# Patient Record
Sex: FEMALE | Race: WHITE
[De-identification: names, ages, dates, MRNs, and addresses within clinical notes are randomized per-mention and may not be internally consistent; named-entity substitution may affect disease eponyms.]

---

## 2017-02-24 ENCOUNTER — HOSPITAL ENCOUNTER (EMERGENCY)
Dept: HOSPITAL 59 - ER | Age: 67
LOS: 1 days | Discharge: HOME | End: 2017-02-25
Payer: MEDICARE

## 2017-02-24 DIAGNOSIS — Z87.891: ICD-10-CM

## 2017-02-24 DIAGNOSIS — R51: ICD-10-CM

## 2017-02-24 DIAGNOSIS — M54.2: ICD-10-CM

## 2017-02-24 DIAGNOSIS — R20.0: ICD-10-CM

## 2017-02-24 DIAGNOSIS — E11.9: ICD-10-CM
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
## 2017-02-24 DIAGNOSIS — S46.911A: Primary | ICD-10-CM

## 2017-02-24 DIAGNOSIS — I10: ICD-10-CM
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
## 2017-02-24 DIAGNOSIS — S70.01XA: ICD-10-CM

## 2017-02-24 PROCEDURE — 73521 X-RAY EXAM HIPS BI 2 VIEWS: CPT

## 2017-02-24 PROCEDURE — 80048 BASIC METABOLIC PNL TOTAL CA: CPT

## 2017-02-24 PROCEDURE — 72125 CT NECK SPINE W/O DYE: CPT

## 2017-02-24 PROCEDURE — 99284 EMERGENCY DEPT VISIT MOD MDM: CPT

## 2017-02-24 PROCEDURE — 70450 CT HEAD/BRAIN W/O DYE: CPT

## 2017-02-24 PROCEDURE — 99283 EMERGENCY DEPT VISIT LOW MDM: CPT

## 2017-02-24 PROCEDURE — 85025 COMPLETE CBC W/AUTO DIFF WBC: CPT

## 2017-02-24 PROCEDURE — 71020: CPT

## 2017-02-25 LAB
ANION GAP SERPL CALC-SCNC: 15.1 MMOL/L (ref 7–16)
BASOPHILS NFR BLD: 0.3 % (ref 0–6)
BUN SERPL-MCNC: 23 MG/DL (ref 7–17)
CO2 SERPL-SCNC: 25.9 MMOL/L (ref 22–30)
CREAT SERPL-MCNC: 1 MG/DL (ref 0.52–1.04)
EOSINOPHIL NFR BLD: 0.3 % (ref 0–6)
ERYTHROCYTE [DISTWIDTH] IN BLOOD BY AUTOMATED COUNT: 15 % (ref 11.5–14.5)
EST GLOMERULAR FILTRATION RATE: 59 ML/MIN
GLUCOSE SERPL-MCNC: 107 MG/DL (ref 70–110)
GRANULOCYTES NFR BLD: 46 % (ref 47–80)
HCT VFR BLD CALC: 38.5 % (ref 35–47)
HGB BLD-MCNC: 12.4 GM/DL (ref 11.6–16)
LYMPHOCYTES NFR BLD AUTO: 44.8 % (ref 16–45)
MCH RBC QN AUTO: 29.4 PG (ref 27–33)
MCHC RBC AUTO-ENTMCNC: 32.2 G/DL (ref 32–36)
MCV RBC AUTO: 91.2 FL (ref 81–97)
MONOCYTES NFR BLD: 8.6 % (ref 0–9)
PLATELET # BLD: 250 K/UL (ref 130–400)
PMV BLD AUTO: 10.5 FL (ref 7.4–10.4)
RBC # BLD AUTO: 4.22 M/UL (ref 3.8–5.4)
WBC # BLD AUTO: 9.1 K/UL (ref 4.2–12.2)
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
## 2017-04-22 ENCOUNTER — HOSPITAL ENCOUNTER (EMERGENCY)
Dept: HOSPITAL 59 - ER | Age: 67
LOS: 1 days | Discharge: HOME | End: 2017-04-23
Payer: MEDICARE

## 2017-04-22 DIAGNOSIS — R07.9: ICD-10-CM

## 2017-04-22 DIAGNOSIS — I10: ICD-10-CM

## 2017-04-22 DIAGNOSIS — Z87.891: ICD-10-CM

## 2017-04-22 DIAGNOSIS — E11.9: ICD-10-CM

## 2017-04-22 DIAGNOSIS — R10.12: Primary | ICD-10-CM

## 2017-04-22 DIAGNOSIS — R21: ICD-10-CM

## 2017-04-22 LAB
ALBUMIN SERPL-MCNC: 4.7 GM/DL (ref 3.5–5)
ALBUMIN/GLOB SERPL: 1.7 {RATIO} (ref 1.1–1.8)
ALP SERPL-CCNC: 117 U/L (ref 38–126)
ALT SERPL-CCNC: 46 U/L (ref 9–52)
ANION GAP SERPL CALC-SCNC: 14.4 MMOL/L (ref 7–16)
APPEARANCE UR: CLEAR
AST SERPL-CCNC: 58 U/L (ref 14–36)
BASOPHILS NFR BLD: 0 % (ref 0–6)
BILIRUB SERPL-MCNC: 0.32 MG/DL (ref 0.2–1.3)
BILIRUB UR-MCNC: NEGATIVE MG/DL
BUN SERPL-MCNC: 17 MG/DL (ref 7–17)
CO2 SERPL-SCNC: 27.6 MMOL/L (ref 22–30)
COLOR UR: YELLOW
CREAT SERPL-MCNC: 0.8 MG/DL (ref 0.52–1.04)
EOSINOPHIL NFR BLD: 3 % (ref 0–6)
ERYTHROCYTE [DISTWIDTH] IN BLOOD BY AUTOMATED COUNT: 14.3 % (ref 11.5–14.5)
EST GLOMERULAR FILTRATION RATE: > 60 ML/MIN
GLOBULIN SER-MCNC: 2.8 GM/DL (ref 1.4–4.8)
GLUCOSE SERPL-MCNC: 112 MG/DL (ref 70–110)
GLUCOSE UR STRIP-MCNC: NEGATIVE MG/DL
HCT VFR BLD CALC: 38.9 % (ref 35–47)
HGB BLD-MCNC: 12.4 GM/DL (ref 11.6–16)
KETONES UR QL STRIP: NEGATIVE
LIPASE SERPL-CCNC: 114 U/L (ref 23–300)
LYMPHOCYTES NFR BLD: 57 % (ref 16–45)
MCH RBC QN AUTO: 28.7 PG (ref 27–33)
MCHC RBC AUTO-ENTMCNC: 31.9 G/DL (ref 32–36)
MCV RBC AUTO: 90 FL (ref 81–97)
MONOCYTES NFR BLD: 4 % (ref 0–9)
NEUTROPHILS NFR BLD AUTO: 36 % (ref 47–80)
NEUTS BAND NFR BLD: 0 % (ref 0–5)
NITRITE UR QL STRIP: NEGATIVE
PLATELET # BLD: 228 K/UL (ref 130–400)
PMV BLD AUTO: 11.2 FL (ref 7.4–10.4)
PROT SERPL-MCNC: 7.5 GM/DL (ref 6.3–8.2)
PROT UR QL STRIP: NEGATIVE
RBC # BLD AUTO: 4.32 M/UL (ref 3.8–5.4)
RBC # UR STRIP: NEGATIVE /UL
URINE LEUKOCYTE ESTERASE: NEGATIVE
UROBILINOGEN UR STRIP-ACNC: 0.2 E.U./DL (ref 0.2–1)
WBC # BLD AUTO: 7.4 K/UL (ref 4.2–12.2)

## 2017-04-22 PROCEDURE — 85027 COMPLETE CBC AUTOMATED: CPT

## 2017-04-22 PROCEDURE — 96374 THER/PROPH/DIAG INJ IV PUSH: CPT

## 2017-04-22 PROCEDURE — 93005 ELECTROCARDIOGRAM TRACING: CPT

## 2017-04-22 PROCEDURE — 74177 CT ABD & PELVIS W/CONTRAST: CPT

## 2017-04-22 PROCEDURE — 99284 EMERGENCY DEPT VISIT MOD MDM: CPT

## 2017-04-22 PROCEDURE — 81003 URINALYSIS AUTO W/O SCOPE: CPT

## 2017-04-22 PROCEDURE — 93010 ELECTROCARDIOGRAM REPORT: CPT

## 2017-04-22 PROCEDURE — 83690 ASSAY OF LIPASE: CPT

## 2017-04-22 PROCEDURE — 80053 COMPREHEN METABOLIC PANEL: CPT

## 2017-04-22 NOTE — EMERGENCY DEPARTMENT RECORD
History of Present Illness





- General


Chief Complaint: Abdominal Pain


Stated Complaint: PAIN ON R SIDE AND BACK


Time Seen by Provider: 17 22:14


Source: Patient


Mode of Arrival: Ambulatory


Limitations: No limitations





- History of Present Illness


Initial Comments: 


67yo female presents with left upper quadrant pain that started last Friday.  

The pain is sharp and radiates to the left flank/back.  No fevers or chills.  

No cough.  The pain comes and goes for a few seconds to minutes.  Non 

exertional.  No changes in urination or bowel movements.  No shortness of 

breath.  The pain does not effect her appetite.  No diarrhea.  No hematuria.  

She does have a history of diverticula.  She states it is tender to touch the 

left upper quadrant.  She saw her chiropractor and also expressed a concern 

about a rib injury.  PCP Dr Ruggiero.  She has an appointment Monday with her 

doctor.





MD Complaint: Abdominal pain, Flank pain


Onset/Timin


-: Days(s)


Location: LUQ


Radiation: Back, Chest, L flank


Severity: Moderate


Quality: Sharp


Consistency: Constant, Getting worse


Improves With: Nothing


Worsens With: Other (palpation, certain movements or positions.)


Associated Symptoms: Denies other symptoms





- Related Data


Patient Pregnant: No


 Home Medications











 Medication  Instructions  Recorded  Confirmed  Last Taken


 


Bimatoprost [Lumigan] 2.5 ml OP DAILY 05/03/15 04/22/17 12/26/16


 


Cyanocobalamin (Vitamin B-12) 1,000 mcg IJ MONTHLY 05/03/15 04/22/17 Unknown





[Vitamin B-12]    


 


Desipramine HCl [Norpramin] 50 mg PO QAM 05/03/15 04/22/17 12/26/16


 


Desipramine HCl [Norpramin] 100 mg PO QPM 05/03/15 04/22/17 12/25/16


 


Lamotrigine 25 mg PO BID 05/03/15 04/22/17 12/26/16


 


Lisinopril/Hydrochlorothiazide 1 tab PO DAILY 05/03/15 04/22/17 12/26/16





[Lisinopril-Hctz 10-12.5 mg Tab]    


 


Nystatin 1 apply TP TID PRN 05/03/15 04/22/17 12/26/16


 


Timolol [Betimol] 5 ml OP BID 05/03/15 04/22/17 12/26/16


 


Verapamil HCl [Verapamil ER] 180 mg PO QHS 05/03/15 04/22/17 12/25/16


 


Omeprazole 40 mg PO BID  cap 16


 


Cholecalciferol (Vitamin D3) 10,000 unit PO DAILY  tab 16





[Vitamin D3]    


 


Brinzolamide [Azopt] 5 ml OP DAILY 16


 


Clonazepam [Clonazepam] 1 mg PO BID PRN 17 Unknown











 Allergies











Allergy/AdvReac Type Severity Reaction Status Date / Time


 


Sulfa (Sulfonamide Allergy  RASH Unverified 17 10:05





Antibiotics)     


 


venlafaxine HCl Allergy  ALTERED Unverified 17 10:05





[From Effexor]   MENTAL  





   STATUS  














Travel Screening





- Travel/Exposure Within Last 30 Days


Have you traveled within the last 30 days?: No





Review of Systems


Constitutional: Denies: Chills, Fever, Malaise, Weakness


Eyes: Denies: Eye discharge, Eye pain, Photophobia, Vision change


ENT: Denies: Congestion, Ear pain, Epistaxis, Throat pain


Respiratory: Denies: Cough, Dyspnea, Hemoptysis, Stridor, Wheezes


Cardiovascular: Denies: Chest pain, Edema, Palpitations, Syncope


Endocrine: Denies: Fatigue


Gastrointestinal: Reports: As per HPI, Abdominal pain.  Denies: Constipation, 

Diarrhea, Hematemesis, Hematochezia, Melena, Nausea, Vomiting


Genitourinary: Denies: Dysuria, Frequency, Hematuria, Urgency


Musculoskeletal: Reports: As per HPI, Back pain, Myalgia.  Denies: Arthralgia, 

Gout, Joint swelling, Neck pain


Skin: Reports: Bruising, Change in color, Rash


Neurological: Denies: Confusion, Headache


Psychiatric: Denies: Anxiety (rough area under her bra where it rubs)


Hematological/Lymphatic: Denies: Blood Clots, Easy bleeding, Easy bruising





Past Medical History





- SOCIAL HISTORY


Smoking Status: Former smoker


Alcohol Use: None


Drug Use: None





- RESPIRATORY


Hx Respiratory Disorders: No





- CARDIOVASCULAR


Hx Cardio Disorders: Yes


Hx Abnormal EKG: Yes


Hx Hypertension: Yes





- NEURO


Hx Neuro Disorders: Yes


Comment:: short trem memory loss, chronic fatigue





- GI


Hx GI Disorders: Yes


Hx Abdominal Pain: Yes


Hx Irritable Bowel: Yes


Hx Nausea/Vomiting: Yes


Comment:: "torturous sigmoid colon"





- 


Hx Genitourinary Disorders: Yes


Hx Kidney Stones: Yes


Hx UTI: Yes





- ENDOCRINE


Hx Endocrine Disorders: Yes


Hx Diabetes: Yes (Type II)





- MUSCULOSKELETAL


Hx Musculoskeletal Disorders: Yes


Hx Arthritis: Yes


Hx Back Injury: Yes





- PSYCH


Hx Psych Problems: Yes


Hx Anxiety: Yes


Hx Depression: Yes (recent loss of (one weekago )0)





- HEMATOLOGY/ONCOLOGY


Hx Hematology/Oncology Disorders: Yes


Hx Anemia: Yes (pernicious anemia, b12 q month)





Family Medical History


Any Significant Family History?: Yes


Hx Cancer: Mother


Hx Heart Disease: Father


Hx Resp Disorders: Mother





Physical Exam





- General


General Appearance: Alert, Oriented x3, Cooperative, No acute distress, Other (

Appears relaxed.  No distress)


Limitations: No limitations





- Head


Head exam: Normal inspection





- Eye


Eye exam: Normal appearance, PERRL.  negative: Conjunctival injection, Scleral 

icterus





- ENT


ENT exam: Normal exam, Mucous membranes moist


Ear exam: Normal external inspection


Nasal Exam: Normal inspection


Mouth exam: Normal external inspection


Teeth exam: Normal inspection


Throat exam: Normal inspection





- Neck


Neck exam: Normal inspection, Full ROM.  negative: Tenderness





- Respiratory


Respiratory exam: Normal lung sounds bilaterally.  negative: Accessory muscle 

use, Chest wall tenderness, Decreased breath sounds, Rales, Respiratory distress

, Rhonchi, Stridor, Wheezes





- Cardiovascular


Cardiovascular Exam: Regular rate, Normal rhythm, Normal heart sounds


Peripheral Pulses: 2+: Radial (R), Radial (L)





- GI/Abdominal


GI/Abdominal exam: Soft, Normal bowel sounds, Tenderness (She is tender in the 

left upper quadrant that is reproducible, soft abdomen, no rash, ).  negative: 

Diminished bowel sounds, Distended, Guarding, Hernia, Rebound, Rigid





- Rectal


Rectal exam: Deferred





- 


 exam: Deferred





- Extremities


Extremities exam: Normal inspection, Full ROM, Normal capillary refill.  

negative: Pedal edema, Tenderness


Image of Full Body: 


  __________________________














  __________________________





 1 - tender in the LUQ, soft abdomen without mass, no rebound or guarding





 2 - mild tenderness left CVA area, no rash








- Back


Back exam: Reports: Normal inspection, CVA tenderness (L), Full ROM, Muscle 

spasm, Paraspinal tenderness.  Denies: CVA tenderness (R), Rash noted, 

Tenderness, Vertebral tenderness





- Neurological


Neurological exam: Alert, Normal gait, Oriented X3, Reflexes normal





- Psychiatric


Psychiatric exam: Normal affect, Normal mood





- Skin


Skin exam: Rash (rough erythematous area appears calloused under the bra where 

it rubs, no vesicular.).  negative: Abrasion, Cyanosis





Course





 Vital Signs











  17





  22:14


 


Temperature 97.8 F


 


Pulse Rate 103 H


 


Respiratory 20





Rate 


 


Blood Pressure 153/97


 


Pulse Ox 99














- Reevaluation(s)


Reevaluation #1: 


EKG 22:35 NSR rate is 94, intervals Qtc 493, Axis is left, ST no acute changes.

  





17 22:46


No changes on the EKG from 16





The patient has a fruit allergy.  The oral contrast has trace fruit so it will 

be held as a caution.








17 22:47





Reevaluation #2: 


No acute changes on the CBC or UA.


17 23:08





Reevaluation #3: 


The CT scan of the abdomen and pelvis is negative for any acute process.


The tenderness is mild and still very reproducible with palpation of the LUQ 

but the abdomen is very soft and benign on examination


She has follow up Monday with her PCP


No acute lab or EKG changes.


We discussed the small rash under her bra and recommended return if it spreads 

which may indicate shingles.  I do not see any convincing vesicle to suggest 

shingles at this time.





17 00:59








Medical Decision Making





- Lab Data


Result diagrams: 


 17 22:50





 17 22:50





Disposition


Disposition: Discharge


Clinical Impression: 


 Left upper quadrant pain


Disposition: Home, Self-Care


Condition: (1) Good


Instructions:  Abdominal Pain (ED)


Additional Instructions: 


follow up with Dr Ruggiero on Monday as scheduled


return if you have any new symptoms or concerns


return if you develop a rash over the area of pain


you may take Tylenol or Motrin for mild pain as directed


Forms:  Patient Portal Access


Time of Disposition: 01:03

## 2017-06-22 ENCOUNTER — HOSPITAL ENCOUNTER (OUTPATIENT)
Dept: HOSPITAL 59 - HOP | Age: 67
Discharge: HOME | End: 2017-06-22
Attending: INTERNAL MEDICINE
Payer: MEDICARE

## 2017-06-22 DIAGNOSIS — E11.9: ICD-10-CM

## 2017-06-22 DIAGNOSIS — Z12.11: Primary | ICD-10-CM

## 2017-06-22 DIAGNOSIS — E78.00: ICD-10-CM

## 2017-06-22 DIAGNOSIS — E03.9: ICD-10-CM

## 2017-06-22 DIAGNOSIS — I10: ICD-10-CM

## 2017-06-22 DIAGNOSIS — D12.4: ICD-10-CM

## 2017-06-22 DIAGNOSIS — Z79.84: ICD-10-CM

## 2017-06-22 DIAGNOSIS — Z79.4: ICD-10-CM

## 2017-06-22 DIAGNOSIS — K57.30: ICD-10-CM

## 2017-06-27 NOTE — OPERATIVE NOTE
DATE OF SURGERY: 06/22/2017



REERRING:  Álvaro Dominique M.D. 



PROCEDURE:  COLONOSCOPY. 



PREOPERATIVE DIAGNOSIS:  Personal history of polyps. 



POSTOPERATIVE DIAGNOSIS:  Normal exam, fair prep, and mildly to moderately tortuous sigmoid colon. 



PROCEDURE:  After informed consent was obtained from the patient, she was placed in the left lateral 
decubitus position in the endoscopy suite, sedated, and monitored by Department of Anesthesia.  
Digital rectal exam was unremarkable.  A well-lubricated [id number] gastroscope was placed in the 
rectum, advanced through a somewhat tortuous sigmoid colon to the descending colon, transverse 
colon, ascending colon, and to the cecum.  Given the patient's history of prior tortuosity, it was 
felt the gastroscope would be most suitable, which, in fact, it was.  In any event, the preparation 
quality was fair.  Numerous areas were rinsed and fluid and stool removed.  The cecum, ileocecal 
valve, appendiceal orifice, ascending colon, transverse colon, descending colon, sigmoid colon, and 
the rectum were carefully inspected.  Numerous areas were lavaged for the fair prep.  No 
abnormalities were otherwise noted.  J-turn views in the anorectum were unremarkable.  The endoscope 
was straightened, the rectal ampulla deflated, and the endoscope was removed. 



RECOMMENDATIONS:  I suggested the patient resume her medications and diet.  I have suggested repeat 
colonoscopy in 3 years, based on the prep quality. 



As always, thank you for allowing me to participate in the care of your patient. 



CC: Álvaro Dominique M.D.

[facility]

## 2019-03-14 ENCOUNTER — APPOINTMENT (RX ONLY)
Dept: URBAN - METROPOLITAN AREA CLINIC 276 | Facility: CLINIC | Age: 69
Setting detail: DERMATOLOGY
End: 2019-03-14

## 2019-03-14 DIAGNOSIS — L81.4 OTHER MELANIN HYPERPIGMENTATION: ICD-10-CM

## 2019-03-14 DIAGNOSIS — L30.1 DYSHIDROSIS [POMPHOLYX]: ICD-10-CM

## 2019-03-14 DIAGNOSIS — D22 MELANOCYTIC NEVI: ICD-10-CM

## 2019-03-14 DIAGNOSIS — L40.8 OTHER PSORIASIS: ICD-10-CM | Status: WORSENING

## 2019-03-14 DIAGNOSIS — L82.1 OTHER SEBORRHEIC KERATOSIS: ICD-10-CM

## 2019-03-14 PROBLEM — L29.8 OTHER PRURITUS: Status: ACTIVE | Noted: 2019-03-14

## 2019-03-14 PROBLEM — E13.9 OTHER SPECIFIED DIABETES MELLITUS WITHOUT COMPLICATIONS: Status: ACTIVE | Noted: 2019-03-14

## 2019-03-14 PROBLEM — L20.84 INTRINSIC (ALLERGIC) ECZEMA: Status: ACTIVE | Noted: 2019-03-14

## 2019-03-14 PROBLEM — M12.9 ARTHROPATHY, UNSPECIFIED: Status: ACTIVE | Noted: 2019-03-14

## 2019-03-14 PROBLEM — D22.5 MELANOCYTIC NEVI OF TRUNK: Status: ACTIVE | Noted: 2019-03-14

## 2019-03-14 PROBLEM — F41.9 ANXIETY DISORDER, UNSPECIFIED: Status: ACTIVE | Noted: 2019-03-14

## 2019-03-14 PROBLEM — J30.1 ALLERGIC RHINITIS DUE TO POLLEN: Status: ACTIVE | Noted: 2019-03-14

## 2019-03-14 PROCEDURE — ? COUNSELING

## 2019-03-14 PROCEDURE — ? INVENTORY

## 2019-03-14 PROCEDURE — ? PRESCRIPTION MEDICATION MANAGEMENT

## 2019-03-14 PROCEDURE — 99214 OFFICE O/P EST MOD 30 MIN: CPT

## 2019-03-14 ASSESSMENT — LOCATION DETAILED DESCRIPTION DERM
LOCATION DETAILED: LEFT ANTECUBITAL SKIN
LOCATION DETAILED: RIGHT MIDDLE FINGERNAIL
LOCATION DETAILED: LEFT SUPERIOR PARIETAL SCALP
LOCATION DETAILED: INFERIOR THORACIC SPINE
LOCATION DETAILED: PERIUNGUAL SKIN LEFT MIDDLE FINGER
LOCATION DETAILED: LEFT SUPERIOR UPPER BACK

## 2019-03-14 ASSESSMENT — LOCATION SIMPLE DESCRIPTION DERM
LOCATION SIMPLE: LEFT UPPER BACK
LOCATION SIMPLE: LEFT MIDDLE FINGER
LOCATION SIMPLE: LEFT UPPER ARM
LOCATION SIMPLE: SCALP
LOCATION SIMPLE: UPPER BACK
LOCATION SIMPLE: RIGHT MIDDLE FINGERNAIL

## 2019-03-14 ASSESSMENT — LOCATION ZONE DERM
LOCATION ZONE: FINGERNAIL
LOCATION ZONE: TRUNK
LOCATION ZONE: ARM
LOCATION ZONE: SCALP
LOCATION ZONE: FINGER

## 2019-03-14 NOTE — PROCEDURE: PRESCRIPTION MEDICATION MANAGEMENT
Otc Regimen: Head and shoulders clinical solutions leave in treatment 3 x weekly.
Detail Level: Zone
Render In Strict Bullet Format?: No
Continue Regimen: Fluocinonide mix with tar
Otc Regimen: Vaniply ointment

## 2019-03-14 NOTE — HPI: RASH (SEBORRHEIC DERMATITIS)
How Severe Is It?: mild
Is This A New Presentation, Or A Follow-Up?: Follow Up Seborrheic Dermatitis
Additional History: Sebopsoriasis